# Patient Record
Sex: FEMALE | Employment: FULL TIME | URBAN - METROPOLITAN AREA
[De-identification: names, ages, dates, MRNs, and addresses within clinical notes are randomized per-mention and may not be internally consistent; named-entity substitution may affect disease eponyms.]

---

## 2018-02-09 NOTE — PERIOP NOTES
[facility]  PREOPERATIVE INSTRUCTIONS    Surgery Date: Wednesday 2/21/18    Surgery arrival time given by surgeon: NO  (If [facility] staff will call you between 3pm - 7pm the day before surgery with your arrival time. If your surgery is on a Monday, we will call you the preceding Friday. Please call [phone] after 7pm if you did not receive your arrival time.) Phone call verification on Tuesday 2/20/18. 1. Report  to the [address] [facility] on the day of your surgery. Bring your insurance card, photo identification, and any copayment (if applicable). 2. You must have a responsible adult to drive you home and stay with you the first 24 hours after surgery if you are going home the same day of your surgery. 3. Nothing to eat or drink after midnight the night before surgery. This means NO water, gum, mints, coffee, juice, etc.    4. MEDICATIONS TO TAKE THE MORNING OF SURGERY WITH A SIP OF WATER:None  5. No alcoholic beverages 24 hours before and after your surgery. 6. If you are being admitted to the hospital,please leave personal belongings/luggage in your car until you have an assigned hospital room number. ( The hospital discharge time is 12 PM NOON. Your adult  should be at the hospital prior to the noon discharge time unless otherwise instructed.)   7. STOP Aspirin and/or any non-steroidal anti-inflammatory drugs (i.e. Ibuprofen, Naproxen, Advil, Aleve) as directed by your surgeon. You may take Tylenol. Stop herbal supplements 1 week prior to  surgery. 8. If you are currently taking Plavix, Coumadin,or any other blood-thinning/ anticoagulant medication contact your surgeon for instructions. 9. Wear comfortable clothes. Wear your glasses instead of contacts. Please leave all money, jewelry and valuables at home. No make up, particularly mascara, the day of surgery. 10.  REMOVE ALL body piercings (Instructed to remove bellybutton ring), rings,and jewelry and leave at home.   Wear your hair loose or down, no pony-tails, buns, or any metal hair clips. 11. If you shower the morning of surgery, please do not apply any lotions, powders, or deodorants afterwards. Do not shave any body area within 24 hours of your surgery. 12. Please follow all instructions to avoid any potential surgical cancellation. 13. Should your physical condition change, (i.e. fever, cold, flu, etc.) please notify your surgeon as soon as possible. 14. It is important to be on time. If a situation occurs where you may be delayed, please call:  [phone] / [phone] on the day of surgery. 15. The Preadmission Testing staff can be reached at [phone]. 16. Special instructions: Free  parking 7a-5p. Bring list of medication taking on day of surgery. 17. The patient was contacted  via phone. She  verbalize  understanding of all instructions does not  need reinforcement.

## 2018-02-20 ENCOUNTER — ANESTHESIA EVENT (OUTPATIENT)
Dept: SURGERY | Age: 23
End: 2018-02-20
Payer: SELF-PAY

## 2018-02-21 ENCOUNTER — ANESTHESIA (OUTPATIENT)
Dept: SURGERY | Age: 23
End: 2018-02-21
Payer: SELF-PAY

## 2018-02-21 ENCOUNTER — HOSPITAL ENCOUNTER (OUTPATIENT)
Age: 23
Setting detail: OUTPATIENT SURGERY
Discharge: HOME | End: 2018-02-21
Attending: SURGERY | Admitting: SURGERY
Payer: SELF-PAY

## 2018-02-21 VITALS
RESPIRATION RATE: 9 BRPM | HEART RATE: 76 BPM | OXYGEN SATURATION: 99 % | SYSTOLIC BLOOD PRESSURE: 119 MMHG | TEMPERATURE: 97.6 F | DIASTOLIC BLOOD PRESSURE: 75 MMHG | HEIGHT: 61 IN | WEIGHT: 105.38 LBS | BODY MASS INDEX: 19.9 KG/M2

## 2018-02-21 LAB — HCG UR QL: NEGATIVE

## 2018-02-21 PROCEDURE — 77030016570 HC BLNKT BAIR HGGR 3M -B: Performed by: SURGERY

## 2018-02-21 PROCEDURE — C1789 PROSTHESIS, BREAST, IMP: HCPCS | Performed by: SURGERY

## 2018-02-21 PROCEDURE — 77030008684 HC TU ET CUF COVD -B: Performed by: NURSE ANESTHETIST, CERTIFIED REGISTERED

## 2018-02-21 PROCEDURE — 77030019908 HC STETH ESOPH SIMS -A: Performed by: NURSE ANESTHETIST, CERTIFIED REGISTERED

## 2018-02-21 PROCEDURE — 76060000033 HC ANESTHESIA 1 TO 1.5 HR: Performed by: SURGERY

## 2018-02-21 PROCEDURE — 74011000272 HC RX REV CODE- 272: Performed by: SURGERY

## 2018-02-21 PROCEDURE — 77030022517 HC TRNSFR ST F/ IMPL MENT -B: Performed by: SURGERY

## 2018-02-21 PROCEDURE — 77030011283 HC ELECTRD NDL COVD -A: Performed by: SURGERY

## 2018-02-21 PROCEDURE — 74011000250 HC RX REV CODE- 250: Performed by: SURGERY

## 2018-02-21 PROCEDURE — 76010000149 HC OR TIME 1 TO 1.5 HR: Performed by: SURGERY

## 2018-02-21 PROCEDURE — 74011250636 HC RX REV CODE- 250/636: Performed by: SURGERY

## 2018-02-21 PROCEDURE — 77030002933 HC SUT MCRYL J&J -A: Performed by: SURGERY

## 2018-02-21 PROCEDURE — 77030003592 HC NDL KEITH ASPN -A: Performed by: SURGERY

## 2018-02-21 PROCEDURE — 77030011264 HC ELECTRD BLD EXT COVD -A: Performed by: SURGERY

## 2018-02-21 PROCEDURE — 77030026227 HC FUNL KELLR 2 PCH ALGN -C: Performed by: SURGERY

## 2018-02-21 PROCEDURE — 77030018836 HC SOL IRR NACL ICUM -A: Performed by: SURGERY

## 2018-02-21 PROCEDURE — 81025 URINE PREGNANCY TEST: CPT

## 2018-02-21 PROCEDURE — 77030020782 HC GWN BAIR PAWS FLX 3M -B

## 2018-02-21 PROCEDURE — 77030026438 HC STYL ET INTUB CARD -A: Performed by: NURSE ANESTHETIST, CERTIFIED REGISTERED

## 2018-02-21 PROCEDURE — 76210000006 HC OR PH I REC 0.5 TO 1 HR: Performed by: SURGERY

## 2018-02-21 PROCEDURE — 74011250636 HC RX REV CODE- 250/636

## 2018-02-21 PROCEDURE — 77030002924 HC SUT GORTX WLGO -B: Performed by: SURGERY

## 2018-02-21 PROCEDURE — 74011000250 HC RX REV CODE- 250

## 2018-02-21 PROCEDURE — 76210000021 HC REC RM PH II 0.5 TO 1 HR: Performed by: SURGERY

## 2018-02-21 PROCEDURE — 77030010507 HC ADH SKN DERMBND J&J -B: Performed by: SURGERY

## 2018-02-21 PROCEDURE — 74011250636 HC RX REV CODE- 250/636: Performed by: ANESTHESIOLOGY

## 2018-02-21 PROCEDURE — 77030032490 HC SLV COMPR SCD KNE COVD -B: Performed by: SURGERY

## 2018-02-21 PROCEDURE — 77030011825 HC SUPP SURG PSTOP S2SG -B: Performed by: SURGERY

## 2018-02-21 PROCEDURE — 77030008463 HC STPLR SKN PROX J&J -B: Performed by: SURGERY

## 2018-02-21 DEVICE — NATRELLE INSPIRA STY SRM-295 MODERATE PROFILE  SMOOTH ROUND SILICONE
Type: IMPLANTABLE DEVICE | Site: BREAST | Status: FUNCTIONAL
Brand: NATRELLE INSPIRA BREAST IMPLANTS

## 2018-02-21 RX ORDER — LIDOCAINE HYDROCHLORIDE 10 MG/ML
0.1 INJECTION, SOLUTION EPIDURAL; INFILTRATION; INTRACAUDAL; PERINEURAL AS NEEDED
Status: DISCONTINUED | OUTPATIENT
Start: 2018-02-21 | End: 2018-02-21 | Stop reason: HOSPADM

## 2018-02-21 RX ORDER — CEFAZOLIN SODIUM/WATER 2 G/20 ML
2 SYRINGE (ML) INTRAVENOUS ONCE
Status: COMPLETED | OUTPATIENT
Start: 2018-02-21 | End: 2018-02-21

## 2018-02-21 RX ORDER — SODIUM CHLORIDE 0.9 % (FLUSH) 0.9 %
5-10 SYRINGE (ML) INJECTION AS NEEDED
Status: DISCONTINUED | OUTPATIENT
Start: 2018-02-21 | End: 2018-02-21 | Stop reason: HOSPADM

## 2018-02-21 RX ORDER — BUPIVACAINE HYDROCHLORIDE AND EPINEPHRINE 5; 5 MG/ML; UG/ML
INJECTION, SOLUTION EPIDURAL; INTRACAUDAL; PERINEURAL AS NEEDED
Status: DISCONTINUED | OUTPATIENT
Start: 2018-02-21 | End: 2018-02-21 | Stop reason: HOSPADM

## 2018-02-21 RX ORDER — LIDOCAINE HYDROCHLORIDE 20 MG/ML
INJECTION, SOLUTION EPIDURAL; INFILTRATION; INTRACAUDAL; PERINEURAL AS NEEDED
Status: DISCONTINUED | OUTPATIENT
Start: 2018-02-21 | End: 2018-02-21 | Stop reason: HOSPADM

## 2018-02-21 RX ORDER — LIDOCAINE HYDROCHLORIDE AND EPINEPHRINE 10; 10 MG/ML; UG/ML
INJECTION, SOLUTION INFILTRATION; PERINEURAL AS NEEDED
Status: DISCONTINUED | OUTPATIENT
Start: 2018-02-21 | End: 2018-02-21 | Stop reason: HOSPADM

## 2018-02-21 RX ORDER — ONDANSETRON 2 MG/ML
INJECTION INTRAMUSCULAR; INTRAVENOUS AS NEEDED
Status: DISCONTINUED | OUTPATIENT
Start: 2018-02-21 | End: 2018-02-21 | Stop reason: HOSPADM

## 2018-02-21 RX ORDER — PROPOFOL 10 MG/ML
INJECTION, EMULSION INTRAVENOUS AS NEEDED
Status: DISCONTINUED | OUTPATIENT
Start: 2018-02-21 | End: 2018-02-21 | Stop reason: HOSPADM

## 2018-02-21 RX ORDER — FENTANYL CITRATE 50 UG/ML
INJECTION, SOLUTION INTRAMUSCULAR; INTRAVENOUS AS NEEDED
Status: DISCONTINUED | OUTPATIENT
Start: 2018-02-21 | End: 2018-02-21 | Stop reason: HOSPADM

## 2018-02-21 RX ORDER — ONDANSETRON 2 MG/ML
4 INJECTION INTRAMUSCULAR; INTRAVENOUS AS NEEDED
Status: DISCONTINUED | OUTPATIENT
Start: 2018-02-21 | End: 2018-02-21 | Stop reason: HOSPADM

## 2018-02-21 RX ORDER — ROCURONIUM BROMIDE 10 MG/ML
INJECTION, SOLUTION INTRAVENOUS AS NEEDED
Status: DISCONTINUED | OUTPATIENT
Start: 2018-02-21 | End: 2018-02-21 | Stop reason: HOSPADM

## 2018-02-21 RX ORDER — DEXAMETHASONE SODIUM PHOSPHATE 4 MG/ML
INJECTION, SOLUTION INTRA-ARTICULAR; INTRALESIONAL; INTRAMUSCULAR; INTRAVENOUS; SOFT TISSUE AS NEEDED
Status: DISCONTINUED | OUTPATIENT
Start: 2018-02-21 | End: 2018-02-21 | Stop reason: HOSPADM

## 2018-02-21 RX ORDER — HYDROMORPHONE HYDROCHLORIDE 1 MG/ML
.5-1 INJECTION, SOLUTION INTRAMUSCULAR; INTRAVENOUS; SUBCUTANEOUS
Status: DISCONTINUED | OUTPATIENT
Start: 2018-02-21 | End: 2018-02-21 | Stop reason: HOSPADM

## 2018-02-21 RX ORDER — MIDAZOLAM HYDROCHLORIDE 1 MG/ML
INJECTION, SOLUTION INTRAMUSCULAR; INTRAVENOUS AS NEEDED
Status: DISCONTINUED | OUTPATIENT
Start: 2018-02-21 | End: 2018-02-21 | Stop reason: HOSPADM

## 2018-02-21 RX ORDER — SODIUM CHLORIDE, SODIUM LACTATE, POTASSIUM CHLORIDE, CALCIUM CHLORIDE 600; 310; 30; 20 MG/100ML; MG/100ML; MG/100ML; MG/100ML
125 INJECTION, SOLUTION INTRAVENOUS CONTINUOUS
Status: DISCONTINUED | OUTPATIENT
Start: 2018-02-21 | End: 2018-02-21 | Stop reason: HOSPADM

## 2018-02-21 RX ORDER — SODIUM CHLORIDE 0.9 % (FLUSH) 0.9 %
5-10 SYRINGE (ML) INJECTION EVERY 8 HOURS
Status: DISCONTINUED | OUTPATIENT
Start: 2018-02-21 | End: 2018-02-21 | Stop reason: HOSPADM

## 2018-02-21 RX ADMIN — ONDANSETRON 4 MG: 2 INJECTION INTRAMUSCULAR; INTRAVENOUS at 16:34

## 2018-02-21 RX ADMIN — Medication 2 G: at 13:40

## 2018-02-21 RX ADMIN — DEXAMETHASONE SODIUM PHOSPHATE 8 MG: 4 INJECTION, SOLUTION INTRA-ARTICULAR; INTRALESIONAL; INTRAMUSCULAR; INTRAVENOUS; SOFT TISSUE at 13:54

## 2018-02-21 RX ADMIN — ONDANSETRON 4 MG: 2 INJECTION INTRAMUSCULAR; INTRAVENOUS at 14:36

## 2018-02-21 RX ADMIN — HYDROMORPHONE HYDROCHLORIDE 0.5 MG: 1 INJECTION, SOLUTION INTRAMUSCULAR; INTRAVENOUS; SUBCUTANEOUS at 15:35

## 2018-02-21 RX ADMIN — LIDOCAINE HYDROCHLORIDE 80 MG: 20 INJECTION, SOLUTION EPIDURAL; INFILTRATION; INTRACAUDAL; PERINEURAL at 13:47

## 2018-02-21 RX ADMIN — FENTANYL CITRATE 150 MCG: 50 INJECTION, SOLUTION INTRAMUSCULAR; INTRAVENOUS at 13:47

## 2018-02-21 RX ADMIN — ROCURONIUM BROMIDE 30 MG: 10 INJECTION, SOLUTION INTRAVENOUS at 13:47

## 2018-02-21 RX ADMIN — PROPOFOL 150 MG: 10 INJECTION, EMULSION INTRAVENOUS at 13:47

## 2018-02-21 RX ADMIN — SODIUM CHLORIDE, SODIUM LACTATE, POTASSIUM CHLORIDE, AND CALCIUM CHLORIDE: 600; 310; 30; 20 INJECTION, SOLUTION INTRAVENOUS at 14:08

## 2018-02-21 RX ADMIN — MIDAZOLAM HYDROCHLORIDE 4 MG: 1 INJECTION, SOLUTION INTRAMUSCULAR; INTRAVENOUS at 13:40

## 2018-02-21 RX ADMIN — HYDROMORPHONE HYDROCHLORIDE 0.5 MG: 1 INJECTION, SOLUTION INTRAMUSCULAR; INTRAVENOUS; SUBCUTANEOUS at 15:20

## 2018-02-21 RX ADMIN — HYDROMORPHONE HYDROCHLORIDE 0.5 MG: 1 INJECTION, SOLUTION INTRAMUSCULAR; INTRAVENOUS; SUBCUTANEOUS at 15:51

## 2018-02-21 RX ADMIN — SODIUM CHLORIDE, SODIUM LACTATE, POTASSIUM CHLORIDE, AND CALCIUM CHLORIDE 125 ML/HR: 600; 310; 30; 20 INJECTION, SOLUTION INTRAVENOUS at 12:35

## 2018-02-21 NOTE — ANESTHESIA POSTPROCEDURE EVALUATION
Post-Anesthesia Evaluation and Assessment    Patient: Jose Carlos Dixon MRN: [id number]  SSN: [id number]    YOB: 1995  Age: 23 y.o. Sex: female       Cardiovascular Function/Vital Signs  Visit Vitals    /75    Pulse 76    Temp 36.4 °C (97.6 °F)    Resp 9    Ht 5' 1\" (1.549 m)    Wt 47.8 kg (105 lb 6.1 oz)    SpO2 99%    BMI 19.91 kg/m2       Patient is status post general anesthesia for Procedure(s):  BILATERAL BREAST AUGMENTATION (SILICONE) . Nausea/Vomiting: None    Postoperative hydration reviewed and adequate. Pain:  Pain Scale 1: Numeric (0 - 10) (02/21/18 1551)  Pain Intensity 1: 4 (02/21/18 1551)   Managed    Neurological Status:   Neuro (WDL): Within Defined Limits (02/21/18 1555)  Neuro  LUE Motor Response: Purposeful (02/21/18 1555)  LLE Motor Response: Purposeful (02/21/18 1555)  RUE Motor Response: Purposeful (02/21/18 1555)  RLE Motor Response: Purposeful (02/21/18 1555)   At baseline    Mental Status and Level of Consciousness: Arousable    Pulmonary Status:   O2 Device: Room air (02/21/18 1555)   Adequate oxygenation and airway patent    Complications related to anesthesia: None    Post-anesthesia assessment completed.  No concerns    Signed By: Ada Swift MD     February 21, 2018

## 2018-02-21 NOTE — DISCHARGE INSTRUCTIONS
Breast Augmentation Patient Instructions    Please call the office to make an appt for Friday    Immediately Following Surgery:    After surgery you will rest quietly for the first 48 hours. You will be able to walk around the house and perform light daily activities; however, during this time, it is not at all unusual for you to feel some pain, soreness and pressure in the chest area. This will gradually subside and Dr. Bari Cordoba will give you pain medication to relieve it. You must take the entire prescription of antibiotics. Be sure to lie on your back whenever you rest or sleep. Keep your head elevated for 72 hours after surgery as this will help with swelling. The surgery bra that you have been put in should be worn constantly during the day and at night. Dr. Bari Cordoba will see you in the office the day after surgery to check your progress. You will then be allowed to shower two days after surgery and change the bra as needed. When taking a shower, remove the bra and take off the gauze. The small white tapes that are under the gauze directly over your incision should be left on. Wash yourself everywhere, including the tapes and your incisions. Use mild soap and pat yourself dry and put the bra back on. You dont need to cover the small white tapes over your incision. This daily routine will help keep the incisions clean, and will promote wound healing. Do not submerge yourself in a bath, swimming pool, or whirlpool for two weeks. You will wear the sports bra for a total of two to three weeks after surgery. The small tape strips covering your incisions. These will remain in place for seven days and will peel off by themselves. A few patients react to the anesthetic after surgery with nausea and vomiting. This usually lasts less than 24 hours and should be treated with lots of fluids. Dr. Bari Cordoba will prescribe nausea medicine for during your preoperative visit.     The maximum swelling occurs at about three days and then begins to dramatically improve. Mild bruising typically resolves within 14 days. You should plan to be off work for up to five to seven days, although this can vary from person to person. Additional Post-Operative Instructions:    No heavy exercise or lifting for three weeks following surgery. This will allow the implants to remain in the proper position without movement. For the first three days following surgery you should try to restrict your arm movements. Move your arms slowly and avoid sudden jerky movements of the chest and breast area. Keep your arms as close to your body as possible. This allows the implants to remain in place. Dr. Sukh Mcdaniel encourages walking immediately after surgery. This activity will greatly minimize the risk of blood clots in your leg veins. Do not expose your breasts to the sun for eight weeks after surgery. Please notify Dr. Sukh Mcdaniel if:   If your one breast becomes significantly larger than the other   If you develop significant bruising across the chest    If you experience a significant increase in pain in the breast after the pain has improved   If you develop a temperature above 101.5° F   If you develop redness (like a sunburn) around your incisions  If you need help or have any questions feel free to call Dr Sukh Mcdaniel with your concerns. Dr. Sukh Mcdaniel is on call 24 hours per day, seven days per week and has an answering service to forward calls. Breast Massage Following Breast Augmentation   You will be taught how to perform the breast massage exercises during your post operative visits. The purpose of these exercises is to keep the scar tissue that forms around implants as soft as possible.   By performing these exercises as described, you can help soften the internal scar, minimize the risk of significant capsular contracture and maximize the likelihood of a soft, natural feel and appearance to your breast.    Begin doing the exercises two weeks after surgery. Dr. Gertrudis Horner will show you the technique during your second post-operative visit. It is important to remember that slow steady massage is more effective than quick jerky movements. Don't worry about injuring the implant; you cannot cause a rupture with these exercises.  Press the breasts slowly and maximally inwards (towards your breast bone) and hold for 10 seconds, then release. Repeat four times.  Press the breasts apart slowly and maximally outwards and hold for 10 seconds, then release. Repeat four times.  Repeat for downward movement.  Repeat for upward movement.  Perform these exercises four times per day for the first three months. The quality of your cosmetic enhancement may be compromised if you fail to return for any scheduled post-op visits, or follow the pre- and post-operative instructions. Please dont hesitate to report any unusual or concerning changes. Our number is [phone]. DISCHARGE SUMMARY from your Nurse    The following personal items collected during your admission are returned to you:   Dental Appliance: Dental Appliances: None  Vision: Visual Aid: None  Hearing Aid:    Jewelry: Jewelry: None  Clothing: Clothing: Shirt, Socks, Footwear, Undergarments, Pants (placed in locker)  Other Valuables: Other Valuables: None  Valuables sent to safe:      PATIENT INSTRUCTIONS:    After general anesthesia or intravenous sedation, for 24 hours or while taking prescription Narcotics:  · Limit your activities  · Do not drive and operate hazardous machinery  · Do not make important personal or business decisions  · Do  not drink alcoholic beverages  · If you have not urinated within 8 hours after discharge, please contact your surgeon on call.     Report the following to your surgeon:  · Excessive pain, swelling, redness or odor of or around the surgical area  · Temperature over 100.5  · Nausea and vomiting lasting longer than 4 hours or if unable to take medications  · Any signs of decreased circulation or nerve impairment to extremity: change in color, persistent  numbness, tingling, coldness or increase pain  · Any questions    COUGH AND DEEP BREATHE    Breathing deep and coughing are very important exercises to do after surgery. Deep breathing and coughing open the little air tubes and air sacks in your lungs. You take deep breaths every day. You may not even notice - it is just something you do when you sigh or yawn. It is a natural exercise you do to keep these air passages open. After surgery, take deep breaths and cough, on purpose. Coughing and deep breathing help prevent bronchitis and pneumonia after surgery. If you had chest or belly surgery, use a pillow as a \"hug kenia\" and hold it tightly to your chest or belly when you cough. DIRECTIONS:  6. Take 10 to 15 slow deep breaths every hour while awake. 7. Breathe in deeply, and hold it for 2 seconds. 8. Exhale slowly through puckered lips, like blowing up a balloon. 9. After every 4th or 5th deep breath, hug your pillow to your chest or belly and give a hard, deep cough. Yes, it will probably hurt. But doing this exercise is very important part of healing after surgery. Take your pain medicine to help you do this exercise without too much pain. IF YOU HAVE BEEN DIAGNOSED WITH SLEEP APNEA, PLEASE USE YOUR SLEEP APNEA DEVICE OR CPAP MACHINE WHEN YOU INTEND TO NAP AFTER TAKING PAIN MEDICATION. Ankle Pumps    Ankle pumps increase the circulation of oxygenated blood to your lower extremities and decrease your risk for circulation problems such as blood clots. They also stretch the muscles, tendons and ligaments in your foot and ankle, and prevent joint contracture in the ankle and foot, especially after surgeries on the legs.     It is important to do ankle pump exercises regularly after surgery because immobility increases your risk for developing a blood clot. Your doctor may also have you take an Aspirin for the next few days as well. If your doctor did not ask you to take an Aspirin, consult with him before starting Aspirin therapy on your own. Slowly point your foot forward, feeling the muscles on the top of your lower leg stretch, and hold this position for 5 seconds. Next, pull your foot back toward you as far as possible, stretching the calf muscles, and hold that position for 5 seconds. Repeat with the other foot. Perform 10 repetitions every hour while awake for both ankles if possible (down and then up with the foot once is one repetition). You should feel gentle stretching of the muscles in your lower leg when doing this exercise. If you feel pain, or your range of motion is limited, don't  Push too hard. Only go the limit your joint and muscles will let you go. If you have increasing pain, progressively worsening leg warmth or swelling, STOP the exercise and call your doctor. These are general instructions for a healthy lifestyle:    *  Please give a list of your current medications to your Primary Care Provider. *  Please update this list whenever your medications are discontinued, doses are      changed, or new medications (including over-the-counter products) are added. *  Please carry medication information at all times in case of emergency situations. About Smoking  No smoking / No tobacco products / Avoid exposure to second hand smoke    Surgeon General's Warning:  Quitting smoking now greatly reduces serious risk to your health. Obesity, smoking, and sedentary lifestyle greatly increases your risk for illness and disease. A healthy diet, regular physical exercise & weight monitoring are important for maintaining a healthy lifestyle.     Congestive Heart Failure  You may be retaining fluid if you have a history of heart failure or if you experience any of the following symptoms:  Weight gain of 3 pounds or more overnight or 5 pounds in a week, increased swelling in our hands or feet or shortness of breath while lying flat in bed. Please call your doctor as soon as you notice any of these symptoms; do not wait until your next office visit. Recognize signs and symptoms of STROKE:  F - face looks uneven  A - arms unable to move or move even  S - speech slurred or non-existent  T - time-call 911 as soon as signs and symptoms begin-DO NOT go         Back to bed or wait to see if you get better-TIME IS BRAIN. Warning signs of HEART ATTACK  Call 911 if you have these symptoms    · Chest discomfort. Most heart attacks involve discomfort in the center of the chest that lasts more than a few minutes, or that goes away and comes back. It can feel like uncomfortable pressure, squeezing, fullness, or pain. · Discomfort in other areas of the upper body. Symptoms can include pain or discomfort in one or both        Arms, the back, neck, jaw, or stomach. ·  Shortness of breath with or without chest discomfort. · Other signs may include breaking out in a cold sweat, nausea, or lightheadedness    Don't wait more than five minutes to call 911 - MINUTES MATTER! Fast action can save your life. Calling 911 is almost always the fastest way to get lifesaving treatment. Emergency Medical Services staff can begin treatment when they arrive - up to an hour sooner than if someone gets to the hospital by car.

## 2018-02-21 NOTE — PERIOP NOTES
Discharge instructions given to patient and her father with opportunity for questions and clarification provided. Pt. Became nauseated when she transferred from stretcher to wheelchair, additional dose of Zofran given for total of 8mg intraop and postop. She transferred from wheelchair to vehicle with minimal assistance. Patient agrees she wants to go home and rest.  Sent home with emesis bag and QueaseEase.

## 2018-02-21 NOTE — BRIEF OP NOTE
BRIEF OPERATIVE NOTE    Date of Procedure: 2/21/2018   Preoperative Diagnosis: COSMETIC   Postoperative Diagnosis: COSMETIC     Procedure(s):  BILATERAL BREAST AUGMENTATION (SILICONE)   Surgeon(s) and Role:     * Jeannette Ang MD - Primary         Assistant Staff: Physician Assistant: Creighton Lesches, PA-C      Surgical Staff:  Circ-1: Marialuisa Infante RN  Physician Assistant: Creighton Lesches, PA-C  Scrub Tech-1: [address] [facility] Staff: Mayi Carrera RN; Shantanu Gregg RN  Event Time In   Incision Start 1411   Incision Close 1452     Anesthesia: General   Estimated Blood Loss: 0  Specimens: * No specimens in log *   Findings: 0   Complications: 0  Implants:   Implant Name Type Inv.  Item Serial No.  Lot No. LRB No. Used Action   IMPL BRST SMTH MP GEL 295ML -- INSPIRA - [id number]  IMPL BRST SMTH MP GEL 295ML -- INSPIRA [id number] [employer] [employer] N/A Left 1 Implanted   IMPL BRST SMTH MP GEL 295ML -- INSPIRA - [id number]   IMPL BRST SMTH MP GEL 295ML -- INSPIRA [id number] [employer] [employer] N/A Right 1 Implanted

## 2018-02-21 NOTE — H&P
History and Physical    Patient is a 23 y. o. well-developed, well nourished female who presents for bilateral breast augmentation. Past Medical History:   Diagnosis Date    ADHD (attention deficit hyperactivity disorder) evaluation 6/22/2016     Past Surgical History:   Procedure Laterality Date    HX ORTHOPAEDIC Left 2008    orif left elbow 2008     Prior to Admission medications    Medication Sig Start Date End Date Taking? Authorizing Provider   dextroamphetamine-amphetamine (ADDERALL) 20 mg tablet Take 1 Tab (20 mg total) by mouth two (2) times a day. Max Daily Amount: 40 mg 1/23/18  Yes MARGARITA De La Cruz MD   L-Norgest&E Estradiol-E Estrad 0.15 mg-30 mcg (84)/10 mcg (7) 3MPk Take  by mouth. Yes Historical Provider     No Known Allergies    General:   No apparent distress. Heart:  Regular rate and rhythm without murmurs or rubs. Lungs:  Clear to ausculation bilaterally; no wheezes, rales or rhonchi. Patient is ready to go to surgery.     Chloé Ruvalcaba MD  2/21/2018

## 2018-02-21 NOTE — IP AVS SNAPSHOT
[address] [place] [address] 104 [address] [place] [phone] Patient: Mavis Rasmussen MRN: [id number] About your hospitalization You were admitted on:  February 21, 2018 You last received care in the:  [facility] PACU You were discharged on:  February 21, 2018 Why you were hospitalized Your primary diagnosis was:  Not on File Follow-up Information Follow up With Details Comments Contact Info [address], [facility] [place] [address] 
[phone] Discharge Orders None A check juancarlos indicates which time of day the medication should be taken. My Medications ASK your doctor about these medications Instructions Each Dose to Equal  
 Morning Noon Evening Bedtime  
 dextroamphetamine-amphetamine 20 mg tablet Commonly known as:  ADDERALL Your last dose was: Your next dose is: Take 1 Tab (20 mg total) by mouth two (2) times a day. Max Daily Amount: 40 mg  
 20 mg  
    
   
   
   
  
 L-Norgest&E Estradiol-E Estrad 0.15 mg-30 mcg (84)/10 mcg (7) 3mpk Your last dose was: Your next dose is: Take  by mouth. Discharge Instructions Breast Augmentation Patient Instructions Please call the office to make an appt for Friday Immediately Following Surgery: After surgery you will rest quietly for the first 48 hours. You will be able to walk around the house and perform light daily activities; however, during this time, it is not at all unusual for you to feel some pain, soreness and pressure in the chest area. This will gradually subside and Dr. Geraldo Jerry will give you pain medication to relieve it. You must take the entire prescription of antibiotics. Be sure to lie on your back whenever you rest or sleep.   Keep your head elevated for 72 hours after surgery as this will help with swelling. The surgery bra that you have been put in should be worn constantly during the day and at night. Dr. Pratik Fonseca will see you in the office the day after surgery to check your progress. You will then be allowed to shower two days after surgery and change the bra as needed. When taking a shower, remove the bra and take off the gauze. The small white tapes that are under the gauze directly over your incision should be left on. Wash yourself everywhere, including the tapes and your incisions. Use mild soap and pat yourself dry and put the bra back on. You dont need to cover the small white tapes over your incision. This daily routine will help keep the incisions clean, and will promote wound healing. Do not submerge yourself in a bath, swimming pool, or whirlpool for two weeks. You will wear the sports bra for a total of two to three weeks after surgery. The small tape strips covering your incisions. These will remain in place for seven days and will peel off by themselves. A few patients react to the anesthetic after surgery with nausea and vomiting. This usually lasts less than 24 hours and should be treated with lots of fluids. Dr. Pratik Fonseca will prescribe nausea medicine for during your preoperative visit. The maximum swelling occurs at about three days and then begins to dramatically improve. Mild bruising typically resolves within 14 days. You should plan to be off work for up to five to seven days, although this can vary from person to person. Additional Post-Operative Instructions: No heavy exercise or lifting for three weeks following surgery. This will allow the implants to remain in the proper position without movement. For the first three days following surgery you should try to restrict your arm movements.   Move your arms slowly and avoid sudden jerky movements of the chest and breast area. Keep your arms as close to your body as possible. This allows the implants to remain in place. Dr. Eri Chapin encourages walking immediately after surgery. This activity will greatly minimize the risk of blood clots in your leg veins. Do not expose your breasts to the sun for eight weeks after surgery. Please notify Dr. Eri Chapin if: 
? If your one breast becomes significantly larger than the other ? If you develop significant bruising across the chest  
? If you experience a significant increase in pain in the breast after the pain has improved ? If you develop a temperature above 101.5° F 
? If you develop redness (like a sunburn) around your incisions If you need help or have any questions feel free to call Dr Eri Chapin with your concerns. Dr. Eri Chapin is on call 24 hours per day, seven days per week and has an answering service to forward calls. Breast Massage Following Breast Augmentation You will be taught how to perform the breast massage exercises during your post operative visits. The purpose of these exercises is to keep the scar tissue that forms around implants as soft as possible. By performing these exercises as described, you can help soften the internal scar, minimize the risk of significant capsular contracture and maximize the likelihood of a soft, natural feel and appearance to your breast. 
 
Begin doing the exercises two weeks after surgery. Dr. Eri Chapin will show you the technique during your second post-operative visit. It is important to remember that slow steady massage is more effective than quick jerky movements. Don't worry about injuring the implant; you cannot cause a rupture with these exercises. ? Press the breasts slowly and maximally inwards (towards your breast bone) and hold for 10 seconds, then release. Repeat four times. ? Press the breasts apart slowly and maximally outwards and hold for 10 seconds, then release. Repeat four times. ? Repeat for downward movement. ? Repeat for upward movement. ? Perform these exercises four times per day for the first three months. The quality of your cosmetic enhancement may be compromised if you fail to return for any scheduled post-op visits, or follow the pre- and post-operative instructions. Please dont hesitate to report any unusual or concerning changes. Our number is [phone]. DISCHARGE SUMMARY from your Nurse The following personal items collected during your admission are returned to you:  
Dental Appliance: Dental Appliances: None Vision: Visual Aid: None Hearing Aid:   
Jewelry: Jewelry: None Clothing: Clothing: Shirt, Socks, Footwear, Undergarments, Pants (placed in locker) Other Valuables: Other Valuables: None Valuables sent to safe:   
 
PATIENT INSTRUCTIONS: 
 
After general anesthesia or intravenous sedation, for 24 hours or while taking prescription Narcotics: · Limit your activities · Do not drive and operate hazardous machinery · Do not make important personal or business decisions · Do  not drink alcoholic beverages · If you have not urinated within 8 hours after discharge, please contact your surgeon on call. Report the following to your surgeon: 
· Excessive pain, swelling, redness or odor of or around the surgical area · Temperature over 100.5 · Nausea and vomiting lasting longer than 4 hours or if unable to take medications · Any signs of decreased circulation or nerve impairment to extremity: change in color, persistent  numbness, tingling, coldness or increase pain · Any questions [address] Breathing deep and coughing are very important exercises to do after surgery. Deep breathing and coughing open the little air tubes and air sacks in your lungs. You take deep breaths every day. You may not even notice - it is just something you do when you sigh or yawn.   It is a natural exercise you do to keep these air passages open. After surgery, take deep breaths and cough, on purpose. Coughing and deep breathing help prevent bronchitis and pneumonia after surgery. If you had chest or belly surgery, use a pillow as a \"hug kenia\" and hold it tightly to your chest or belly when you cough. DIRECTIONS: 
6. Take 10 to 15 slow deep breaths every hour while awake. 7. Breathe in deeply, and hold it for 2 seconds. 8. Exhale slowly through puckered lips, like blowing up a balloon. 9. After every 4th or 5th deep breath, hug your pillow to your chest or belly and give a hard, deep cough. Yes, it will probably hurt. But doing this exercise is very important part of healing after surgery. Take your pain medicine to help you do this exercise without too much pain. IF YOU HAVE BEEN DIAGNOSED WITH SLEEP APNEA, PLEASE USE YOUR SLEEP APNEA DEVICE OR CPAP MACHINE WHEN YOU INTEND TO NAP AFTER TAKING PAIN MEDICATION. Ankle Pumps Ankle pumps increase the circulation of oxygenated blood to your lower extremities and decrease your risk for circulation problems such as blood clots. They also stretch the muscles, tendons and ligaments in your foot and ankle, and prevent joint contracture in the ankle and foot, especially after surgeries on the legs. It is important to do ankle pump exercises regularly after surgery because immobility increases your risk for developing a blood clot. Your doctor may also have you take an Aspirin for the next few days as well. If your doctor did not ask you to take an Aspirin, consult with him before starting Aspirin therapy on your own. Slowly point your foot forward, feeling the muscles on the top of your lower leg stretch, and hold this position for 5 seconds. Next, pull your foot back toward you as far as possible, stretching the calf muscles, and hold that position for 5 seconds. Repeat with the other foot. Perform 10 repetitions every hour while awake for both ankles if possible (down and then up with the foot once is one repetition). You should feel gentle stretching of the muscles in your lower leg when doing this exercise. If you feel pain, or your range of motion is limited, don't  Push too hard. Only go the limit your joint and muscles will let you go. If you have increasing pain, progressively worsening leg warmth or swelling, STOP the exercise and call your doctor. These are general instructions for a healthy lifestyle: *  Please give a list of your current medications to your Primary Care Provider. *  Please update this list whenever your medications are discontinued, doses are 
    changed, or new medications (including over-the-counter products) are added. *  Please carry medication information at all times in case of emergency situations. About Smoking No smoking / No tobacco products / Avoid exposure to second hand smoke Surgeon General's Warning:  Quitting smoking now greatly reduces serious risk to your health. Obesity, smoking, and sedentary lifestyle greatly increases your risk for illness and disease. A healthy diet, regular physical exercise & weight monitoring are important for maintaining a healthy lifestyle. Congestive Heart Failure You may be retaining fluid if you have a history of heart failure or if you experience any of the following symptoms:  Weight gain of 3 pounds or more overnight or 5 pounds in a week, increased swelling in our hands or feet or shortness of breath while lying flat in bed. Please call your doctor as soon as you notice any of these symptoms; do not wait until your next office visit. Recognize signs and symptoms of STROKE: 
F - face looks uneven A - arms unable to move or move even S - speech slurred or non-existent T - time-call 911 as soon as signs and symptoms begin-DO NOT go  
 Back to bed or wait to see if you get better-TIME IS BRAIN. Warning signs of HEART ATTACK Call 911 if you have these symptoms · Chest discomfort. Most heart attacks involve discomfort in the center of the chest that lasts more than a few minutes, or that goes away and comes back. It can feel like uncomfortable pressure, squeezing, fullness, or pain. · Discomfort in other areas of the upper body. Symptoms can include pain or discomfort in one or both Arms, the back, neck, jaw, or stomach. ·  Shortness of breath with or without chest discomfort. · Other signs may include breaking out in a cold sweat, nausea, or lightheadedness Don't wait more than five minutes to call 911 - MINUTES MATTER! Fast action can save your life. Calling 911 is almost always the fastest way to get lifesaving treatment. Emergency Medical Services staff can begin treatment when they arrive - up to an hour sooner than if someone gets to the hospital by car. Introducing \[facility] [place] [facility]\"" & HEALTH SERVICES! Carmelita Fothergill introduces [employer] patient portal. Now you can access parts of your medical record, email your doctor's office, and request medication refills online. 1. In your internet browser, go to [email or web address] "[email or web address] 2. Click on the First Time User? Click Here link in the Sign In box. You will see the New Member Sign Up page. 3. Enter your [employer] Access Code exactly as it appears below. You will not need to use this code after youve completed the sign-up process. If you do not sign up before the expiration date, you must request a new code. · [employer] Access Code: [email or web address] Expires: 5/20/2018 11:55 AM 
 
4. Enter the last four digits of your Social Security Number (xxxx) and Date of Birth (mm/dd/yyyy) as indicated and click Submit. You will be taken to the next sign-up page. 5. Create a [employer] ID.  This will be your [employer] login ID and cannot be changed, so think of one that is secure and easy to remember. 6. Create a [employer] password. You can change your password at any time. 7. Enter your Password Reset Question and Answer. This can be used at a later time if you forget your password. 8. Enter your e-mail address. You will receive e-mail notification when new information is available in [address] 9. Click Sign Up. You can now view and download portions of your medical record. 10. Click the Download Summary menu link to download a portable copy of your medical information. If you have questions, please visit the Frequently Asked Questions section of the [employer] website. Remember, [employer] is NOT to be used for urgent needs. For medical emergencies, dial 911. Now available from your iPhone and Android! Providers Seen During Your Hospitalization Provider Specialty Primary office phone Michael Rodriguez MD Plastic Surgery [phone] Your Primary Care Physician (PCP) Primary Care Physician Office Phone Office Fax Norberto Johnson [phone] You are allergic to the following No active allergies Recent Documentation Height Weight BMI OB Status Smoking Status 1.549 m 47.8 kg 19.91 kg/m2 Having regular periods Never Smoker Emergency Contacts Name Discharge Info Relation Home Work Mobile Naman Argue CAREGIVER [3] Mother [14] [phone] Patient Belongings The following personal items are in your possession at time of discharge: 
  Dental Appliances: None  Visual Aid: None   Hearing Aids/Status: Does not own  Home Medications: None   Jewelry: None  Clothing: Shirt, Socks, Footwear, Undergarments, Pants (placed in locker)    Other Valuables: None Please provide this summary of care documentation to your next provider.  
  
  
 
  
Signatures-by signing, you are acknowledging that this After Visit Summary has been reviewed with you and you have received a copy. Patient Signature:  ____________________________________________________________ Date:  ____________________________________________________________  
  
Hurshel Och Provider Signature:  ____________________________________________________________ Date:  ____________________________________________________________

## 2023-05-17 RX ORDER — LEVONORGESTREL / ETHINYL ESTRADIOL AND ETHINYL ESTRADIOL 150-30(84)
KIT ORAL
COMMUNITY

## 2023-05-17 RX ORDER — DEXTROAMPHETAMINE SACCHARATE, AMPHETAMINE ASPARTATE, DEXTROAMPHETAMINE SULFATE AND AMPHETAMINE SULFATE 7.5; 7.5; 7.5; 7.5 MG/1; MG/1; MG/1; MG/1
1 TABLET ORAL 2 TIMES DAILY
COMMUNITY
Start: 2023-04-23 | End: 2023-05-23 | Stop reason: SDUPTHER

## (undated) DEVICE — SUTURE MCRYL SZ 3-0 L27IN ABSRB UD L26MM SH 1/2 CIR Y416H

## (undated) DEVICE — DEVON™ KNEE AND BODY STRAP 60" X 3" (1.5 M X 7.6 CM): Brand: DEVON

## (undated) DEVICE — SUTURE MCRYL SZ 4-0 L27IN ABSRB UD L19MM PS-2 1/2 CIR PRIM Y426H

## (undated) DEVICE — SUTURE GORTX SZ 0 L36IN NONABSORBABLE L22MM TH-22 1/2 CIR 2N03A

## (undated) DEVICE — BRA SURG POST-OP MED 38IN --

## (undated) DEVICE — SOLUTION IV 250ML 0.9% SOD CHL CLR INJ FLX BG CONT PRT CLSR

## (undated) DEVICE — DRAPE FLD WRM W44XL66IN C6L FOR INTRATEMP SYS THERMABASIN

## (undated) DEVICE — ASEPTIC TRANSFER SYSTEM --

## (undated) DEVICE — 3M™ BAIR HUGGER® UNDERBODY BLANKET, FULL ACCESS, 10 PER CASE 63500: Brand: BAIR HUGGER™

## (undated) DEVICE — Z DISCONTINUED PER MEDLINE PACK PROCEDURE SURG PLAS

## (undated) DEVICE — INFECTION CONTROL KIT SYS

## (undated) DEVICE — DRAPE,CHEST,FENES,15X10,STERIL: Brand: MEDLINE

## (undated) DEVICE — ELECTRODE NDL 2.8IN COAT VALLEYLAB

## (undated) DEVICE — Z DISCONTINUEDSOLUTION PREP 2OZ 10% POVIDONE IOD SCR CAP BTL

## (undated) DEVICE — INTENT TO BE USED WITH SUTURE MATERIAL FOR TISSUE CLOSURE: Brand: RICHARD-ALLAN® NEEDLE STRAIGHT CUTTING

## (undated) DEVICE — CONNECTOR IV REG NDLLSS FEM LUERLOCK OR ADPT DEHP FREE [415067] [B BRAUN MEDICAL INC]

## (undated) DEVICE — DERMABOND SKIN ADH 0.7ML -- DERMABOND ADVANCED 12/BX

## (undated) DEVICE — COVER LT HNDL BLU PLAS

## (undated) DEVICE — 3000CC GUARDIAN II: Brand: GUARDIAN

## (undated) DEVICE — TRAY PREP DRY W/ PREM GLV 2 APPL 6 SPNG 2 UNDPD 1 OVERWRAP

## (undated) DEVICE — SUTURE MCRYL SZ 5-0 L18IN ABSRB UD L19MM PS-2 3/8 CIR PRIM Y495G

## (undated) DEVICE — MEDI-VAC NON-CONDUCTIVE SUCTION TUBING: Brand: CARDINAL HEALTH

## (undated) DEVICE — BLADE ELECTRODE: Brand: EDGE

## (undated) DEVICE — GAUZE SPONGES,12 PLY: Brand: CURITY

## (undated) DEVICE — STERILE POLYISOPRENE POWDER-FREE SURGICAL GLOVES: Brand: PROTEXIS

## (undated) DEVICE — KENDALL SCD EXPRESS SLEEVES, KNEE LENGTH, MEDIUM: Brand: KENDALL SCD

## (undated) DEVICE — STERILE POLYISOPRENE POWDER-FREE SURGICAL GLOVES WITH EMOLLIENT COATING: Brand: PROTEXIS

## (undated) DEVICE — STAPLER SKIN H3.9MM WIRE DIA0.58MM CRWN 6.9MM 35 STPL ROT

## (undated) DEVICE — SOLUTION IV 1000ML 0.9% SOD CHL

## (undated) DEVICE — SOLUTION SCRB 4OZ 10% PVP I POVIDONE IOD TOP PAINT EXIDINE

## (undated) DEVICE — BANDAGE COMPR SELF ADH 5 YDX4 IN TAN STRL PREMIERPRO LF

## (undated) DEVICE — LIGHT HANDLE: Brand: DEVON

## (undated) DEVICE — SYSTEM IMPL DEL FOR BRST IMPL FUN (SEE COMMENT)